# Patient Record
Sex: MALE | Race: BLACK OR AFRICAN AMERICAN | Employment: STUDENT | URBAN - METROPOLITAN AREA
[De-identification: names, ages, dates, MRNs, and addresses within clinical notes are randomized per-mention and may not be internally consistent; named-entity substitution may affect disease eponyms.]

---
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
## 2021-02-11 ENCOUNTER — VIRTUAL VISIT (OUTPATIENT)
Dept: FAMILY MEDICINE CLINIC | Age: 21
End: 2021-02-11
Payer: COMMERCIAL

## 2021-02-11 DIAGNOSIS — J45.40 MODERATE PERSISTENT ASTHMA WITHOUT COMPLICATION: ICD-10-CM

## 2021-02-11 PROCEDURE — 99214 OFFICE O/P EST MOD 30 MIN: CPT | Performed by: FAMILY MEDICINE

## 2021-02-11 RX ORDER — ALBUTEROL SULFATE 90 UG/1
AEROSOL, METERED RESPIRATORY (INHALATION)
Qty: 36 INHALER | Refills: 0 | Status: SHIPPED | OUTPATIENT
Start: 2021-02-11 | End: 2021-06-11

## 2021-02-11 RX ORDER — BUDESONIDE AND FORMOTEROL FUMARATE DIHYDRATE 160; 4.5 UG/1; UG/1
AEROSOL RESPIRATORY (INHALATION)
Qty: 30.6 INHALER | Refills: 3 | Status: SHIPPED | OUTPATIENT
Start: 2021-02-11 | End: 2021-06-03 | Stop reason: ALTCHOICE

## 2021-02-11 RX ORDER — ALBUTEROL SULFATE 0.83 MG/ML
SOLUTION RESPIRATORY (INHALATION)
Qty: 30 NEBULE | Refills: 3 | Status: SHIPPED | OUTPATIENT
Start: 2021-02-11

## 2021-02-17 NOTE — PATIENT INSTRUCTIONS

## 2021-02-17 NOTE — PROGRESS NOTES
Brandi Camarillo is a 24 y.o. male who was seen by synchronous (real-time) audio-video technology on 2/11/2021 for Asthma        Assessment & Plan:   Diagnoses and all orders for this visit:    1. Moderate persistent asthma without complication  -     albuterol (PROVENTIL VENTOLIN) 2.5 mg /3 mL (0.083 %) nebu; 1 unit dose  VIA NEBULIZER EVERY 4 HOURS AS NEEDED FOR COUGH/WHEEZE    Other orders  -     budesonide-formoteroL (Symbicort) 160-4.5 mcg/actuation HFAA; INHALE 2 PUFFS BY MOUTH TWICE DAILY WITH SPACER CHAMBER RINSE MOUTH AND THROAT AFTER USE  -     albuterol (Ventolin HFA) 90 mcg/actuation inhaler; TAKE 2 PUFFS BY INHALATION EVERY FOUR HOURS AS NEEDED FOR WHEEZING. -     inhalational spacing device; 1 Each by Does Not Apply route as needed for Wheezing. As above, not fully controlled   treatment plan as listed below  Orders Placed This Encounter    budesonide-formoteroL (Symbicort) 160-4.5 mcg/actuation HFAA    albuterol (Ventolin HFA) 90 mcg/actuation inhaler    albuterol (PROVENTIL VENTOLIN) 2.5 mg /3 mL (0.083 %) nebu    inhalational spacing device     Follow-up and Dispositions    · Return in about 6 months (around 8/11/2021) for well exam.       This has been fully explained to the patient, who indicates understanding. [facility] and pt has been advised of same. 712  Subjective:     Pt has asthma  He needs refills on his inhalers  He reports increase asthma sx when the weather changes  He has no acute sx today  Has no other complaints. Prior to Admission medications    Medication Sig Start Date End Date Taking? Authorizing Provider   budesonide-formoteroL (Symbicort) 160-4.5 mcg/actuation HFAA INHALE 2 PUFFS BY MOUTH TWICE DAILY WITH SPACER CHAMBER RINSE MOUTH AND THROAT AFTER USE 2/11/21  Yes Darci Tony MD   albuterol (Ventolin HFA) 90 mcg/actuation inhaler TAKE 2 PUFFS BY INHALATION EVERY FOUR HOURS AS NEEDED FOR WHEEZING.  2/11/21  Yes Dacri Tony MD   albuterol (PROVENTIL VENTOLIN) 2.5 mg /3 mL (0.083 %) nebu 1 unit dose  VIA NEBULIZER EVERY 4 HOURS AS NEEDED FOR COUGH/WHEEZE 2/11/21  Yes Jeremias Nassar MD   inhalational spacing device 1 Each by Does Not Apply route as needed for Wheezing. 2/11/21  Yes Jeremias Nassar MD   clobetasol (TEMOVATE) 0.05 % topical cream Apply  to affected area two (2) times daily as needed for Skin Irritation. 11/30/17  Yes Alicia Barone NP   montelukast (SINGULAIR) 10 mg tablet Take 1 Tab by mouth daily. 11/30/17  Yes Tariq Geiger NP   Nebulizer & Compressor machine by Does Not Apply route. Use with albuterol every four hours as needed for wheezing, coughing , or shortness of breath 11/1/11   Alicia Barone NP   MULTI-VITAMIN PO Take  by mouth. Provider, Historical     Patient Active Problem List    Diagnosis Date Noted    Moderate persistent asthma without complication 91/42/1149    Atopic dermatitis 04/28/2011    ADHD (attention deficit hyperactivity disorder) 10/07/2010     Allergies   Allergen Reactions    Peanut Swelling    Tree Nut Itching     Past Medical History:   Diagnosis Date    ADHD     Orthodoxy Psychotherapy    Allergic rhinitis     Contact dermatitis and other eczema, due to unspecified cause     eczema    GERD (gastroesophageal reflux disease)      No past surgical history on file. No family history on file. Social History     Tobacco Use    Smoking status: Never Smoker    Smokeless tobacco: Never Used   Substance Use Topics    Alcohol use: No       Review of Systems   Constitutional: Negative for chills and fever. Cardiovascular: Negative for chest pain and palpitations. All other systems reviewed and are negative. Objective:   No flowsheet data found.    General: alert, cooperative, no distress   Mental  status: normal mood, behavior, speech, dress, motor activity, and thought processes, able to follow commands   HENT: NCAT   Neck: no visualized mass Resp: no respiratory distress   Neuro: no gross deficits   Skin: no discoloration or lesions of concern on visible areas   Psychiatric: normal affect, consistent with stated mood, no evidence of hallucinations     Additional exam findings: none      We discussed the expected course, resolution and complications of the diagnosis(es) in detail. Medication risks, benefits, costs, interactions, and alternatives were discussed as indicated. I advised him to contact the office if his condition worsens, changes or fails to improve as anticipated. He expressed understanding with the diagnosis(es) and plan. Shankar Osman IV, who was evaluated through a patient-initiated, synchronous (real-time) audio-video encounter, and/or his healthcare decision maker, is aware that it is a billable service, with coverage as determined by his insurance carrier. He provided verbal consent to proceed: Yes, and patient identification was verified. It was conducted pursuant to the emergency declaration under the [address] [place] [address], [address] [place] [address] authority and the [employer] Act. A caregiver was present when appropriate. Ability to conduct physical exam was limited. I was at home. The patient was at home.       Tamra Ferreira MD

## 2021-06-01 ENCOUNTER — TELEPHONE (OUTPATIENT)
Dept: FAMILY MEDICINE CLINIC | Age: 21
End: 2021-06-01

## 2021-06-01 NOTE — TELEPHONE ENCOUNTER
Pt called states having issues with asthma with wheezing and sob, forwarded for triage, please adv [phone]

## 2021-06-01 NOTE — TELEPHONE ENCOUNTER
Patient call in c/o wheezing. Stated he had been using his inhalers, and not helping.  Scheduled sick call appt for this thursday

## 2021-06-03 ENCOUNTER — OFFICE VISIT (OUTPATIENT)
Dept: FAMILY MEDICINE CLINIC | Age: 21
End: 2021-06-03
Payer: COMMERCIAL

## 2021-06-03 VITALS
TEMPERATURE: 96.8 F | SYSTOLIC BLOOD PRESSURE: 106 MMHG | BODY MASS INDEX: 30.04 KG/M2 | WEIGHT: 254.4 LBS | OXYGEN SATURATION: 96 % | DIASTOLIC BLOOD PRESSURE: 69 MMHG | HEIGHT: 77 IN | HEART RATE: 61 BPM | RESPIRATION RATE: 17 BRPM

## 2021-06-03 DIAGNOSIS — J45.40 MODERATE PERSISTENT ASTHMA WITHOUT COMPLICATION: ICD-10-CM

## 2021-06-03 DIAGNOSIS — Z23 ENCOUNTER FOR IMMUNIZATION: ICD-10-CM

## 2021-06-03 DIAGNOSIS — Z00.00 ROUTINE MEDICAL EXAM: Primary | ICD-10-CM

## 2021-06-03 PROCEDURE — 90734 MENACWYD/MENACWYCRM VACC IM: CPT | Performed by: FAMILY MEDICINE

## 2021-06-03 PROCEDURE — 99395 PREV VISIT EST AGE 18-39: CPT | Performed by: FAMILY MEDICINE

## 2021-06-03 RX ORDER — FLUTICASONE PROPIONATE AND SALMETEROL 250; 50 UG/1; UG/1
1 POWDER RESPIRATORY (INHALATION) EVERY 12 HOURS
Qty: 1 INHALER | Refills: 3 | Status: SHIPPED | OUTPATIENT
Start: 2021-06-03 | End: 2022-08-02 | Stop reason: ALTCHOICE

## 2021-06-03 NOTE — PATIENT INSTRUCTIONS
Asthma Attack: Care Instructions  Overview     During an asthma attack, the airways swell and narrow. This makes it hard to breathe. Severe asthma attacks can be dangerous. But you can help prevent these attacks by keeping your asthma under control and treating symptoms before they get bad. Symptoms include being short of breath, having chest tightness, coughing, and wheezing. Noting and treating these symptoms can also help you avoid trips to the emergency room. If you notice any problems or new symptoms, get medical treatment right away. Follow-up care is a key part of your treatment and safety. Be sure to make and go to all appointments, and call your doctor if you are having problems. It's also a good idea to know your test results and keep a list of the medicines you take. How can you care for yourself at home? · Follow your asthma action plan to prevent and treat attacks. If you don't have an asthma action plan, work with your doctor to create one. · Take your asthma medicines exactly as prescribed. Talk to your doctor right away if you have any questions about how to take them. ? Use your quick-relief medicine when you have symptoms of an attack. Quick-relief medicine is usually an albuterol inhaler. Some people need to use quick-relief medicine before they exercise. ? Take your controller medicine every day, not just when you have symptoms. Controller medicine is usually an inhaled corticosteroid. The goal is to prevent problems before they occur. ? If your doctor prescribed corticosteroid pills to use during an attack, take them exactly as prescribed. It may take hours for the pills to work, but they may make the episode shorter and help you breathe better. ? Keep your quick-relief medicine with you at all times. · Talk to your doctor before using other medicines. Some medicines, such as aspirin, can cause asthma attacks in some people.   · If you have a peak flow meter, use it to check how well you are breathing. This can help you predict when an asthma attack is going to occur. Then you can take medicine to prevent the asthma attack or make it less severe. · Do not smoke or allow others to smoke around you. Avoid smoky places. Smoking makes asthma worse. If you need help quitting, talk to your doctor about stop-smoking programs and medicines. These can increase your chances of quitting for good. · Learn what triggers an asthma attack for you, and avoid the triggers when you can. Common triggers include colds, smoke, air pollution, dust, pollen, mold, pets, cockroaches, stress, and cold air. · Avoid colds and the flu. Talk to your doctor about getting a pneumococcal vaccine shot. If you have had one before, ask your doctor if you need a second dose. Get a flu vaccine every fall. If you must be around people with colds or the flu, wash your hands often. When should you call for help? Call 911 anytime you think you may need emergency care. For example, call if:    · You have severe trouble breathing. Call your doctor now or seek immediate medical care if:    · Your symptoms do not get better after you have followed your asthma action plan.     · You have new or worse trouble breathing.     · Your coughing and wheezing get worse.     · You cough up dark brown or bloody mucus (sputum).     · You have a new or higher fever. Watch closely for changes in your health, and be sure to contact your doctor if:    · You need to use quick-relief medicine on more than 2 days a week within a month (unless it is just for exercise).     · You cough more deeply or more often, especially if you notice more mucus or a change in the color of your mucus.     · You are not getting better as expected. Where can you learn more? Go to http://www.gray.com/  Enter B220 in the search box to learn more about \"Asthma Attack: Care Instructions. \"  Current as of: October 26, 2020               Content Version: 12.8  © 7739-4532 Healthwise, Incorporated. Care instructions adapted under license by [employer] (which disclaims liability or warranty for this information). If you have questions about a medical condition or this instruction, always ask your healthcare professional. [address] any warranty or liability for your use of this information.

## 2021-06-03 NOTE — PROGRESS NOTES
Casimiro Vizcarra presents today for   Chief Complaint   Patient presents with    Well Male     well exam    Asthma     f/u       Is someone accompanying this pt? no    Is the patient using any DME equipment during OV? no    Depression Screening:  3 most recent PHQ Screens 6/3/2021   Little interest or pleasure in doing things Not at all   Feeling down, depressed, irritable, or hopeless Not at all   Total Score PHQ 2 0   In the past year have you felt depressed or sad most days, even if you felt okay? -   Has there been a time in the past month when you have had serious thoughts about ending your life? -   Have you ever in your whole life, tried to kill yourself or made a suicide attempt? -       Learning Assessment:  Learning Assessment 8/7/2014   PRIMARY LEARNER Patient   PRIMARY LANGUAGE ENGLISH   LEARNER PREFERENCE PRIMARY DEMONSTRATION   ANSWERED BY patient   RELATIONSHIP SELF       Health Maintenance reviewed and discussed and ordered per Provider. Health Maintenance Due   Topic Date Due    Hepatitis C Screening  Never done    Pneumococcal 0-64 years (1 of 2 - PPSV23) Never done    HPV Age 9Y-34Y (3 - Male 2-dose series) Never done    DTaP/Tdap/Td series (6 - Tdap) 10/04/2012    COVID-19 Vaccine (1) Never done   . Coordination of Care:  1. Have you been to the ER, urgent care clinic since your last visit? Hospitalized since your last visit? no    2. Have you seen or consulted any other health care providers outside of the [address] [place] [address] since your last visit? Include any pap smears or colon screening. no            MA notes above. Subjective:   Casimiro Vizcarra is a 24 y.o. male presenting for his annual checkup. ROS:  Feeling well. No dyspnea or chest pain on exertion. No abdominal pain, change in bowel habits, black or bloody stools. No urinary tract or prostatic symptoms. No neurological complaints.     Specific concerns today: pt would like a letter for his [employer] sports team to state that due to his history of asthma and allergies to different allergens he can be exempt from the Covid vaccination. Patient has recurrent asthma symptoms he is currently not taking a maintenance inhaler as he does not feel like the Symbicort prescribed works effectively    Patient is interested in other vaccinations he may need prior to going to college. Patient Active Problem List    Diagnosis Date Noted    Moderate persistent asthma without complication 88/16/7108    Atopic dermatitis 04/28/2011    ADHD (attention deficit hyperactivity disorder) 10/07/2010     Allergies   Allergen Reactions    Peanut Swelling    Tree Nut Itching     Past Medical History:   Diagnosis Date    ADHD     Restorationism Psychotherapy    Allergic rhinitis     Contact dermatitis and other eczema, due to unspecified cause     eczema    GERD (gastroesophageal reflux disease)      No past surgical history on file. No family history on file. Social History     Tobacco Use    Smoking status: Never Smoker    Smokeless tobacco: Never Used   Substance Use Topics    Alcohol use: No             Objective:     Visit Vitals  /69 (BP 1 Location: Left arm, BP Patient Position: Sitting, BP Cuff Size: Adult long)   Pulse 61   Temp 96.8 °F (36 °C) (Temporal)   Resp 17   Ht 6' 5\" (1.956 m)   Wt 254 lb 6.4 oz (115.4 kg)   SpO2 96%   BMI 30.17 kg/m²     The patient appears well, alert, oriented x 3, in no distress. ENT normal.  Neck supple. No adenopathy or thyromegaly. DYLON. Lungs are clear, good air entry, no wheezes, rhonchi or rales. S1 and S2 normal, no murmurs, regular rate and rhythm. Abdomen is soft without tenderness, guarding, mass or organomegaly. Extremities show no edema, normal peripheral pulses. Neurological is normal without focal findings. Assessment/Plan:   healthy adult male      ICD-10-CM ICD-9-CM    1. Routine medical exam  Z00.00 V70.0    2.  Moderate persistent asthma without complication  L99.41 493.90    3. Encounter for immunization  Z23 V03.89 MENINGOCOCCAL (MENVEO) CONJUGATE VACCINE, SEROGROUPS A, C, Y AND W-135 (TETRAVALENT), IM   . As above  Patient is stable  Meningococcal vaccination today. treatment plan as listed below  Orders Placed This Encounter    Meningococcal (MENVEO) Conjugate Vaccine, Serogroups  A, C, Y and W-135 (TETRAVALENT), IM    fluticasone propion-salmeteroL (ADVAIR/WIXELA) 250-50 mcg/dose diskus inhaler     Start Advair as ordered. Follow-up and Dispositions    · Return in about 3 months (around 9/3/2021). This has been fully explained to the patient, who indicates understanding. An After Visit Summary was printed and given to the patient.

## 2021-06-03 NOTE — LETTER
6/3/2021 1:19 PM 
 
Mr. Neftaly Patton [address] [facility] [address] To Whom It May Concern: 
 
[facility] the [facility] Please be advised that Mr. Gaurang Coreas IV has multiple allergies that at this time precludes him from taking the COVID-19 vaccination. If there are questions or concerns please have the patient contact our office. Sincerely, Savana Recio MD

## 2021-06-11 RX ORDER — ALBUTEROL SULFATE 90 UG/1
AEROSOL, METERED RESPIRATORY (INHALATION)
Qty: 17 INHALER | Refills: 2 | Status: SHIPPED | OUTPATIENT
Start: 2021-06-11 | End: 2022-08-02 | Stop reason: SDUPTHER

## 2022-03-19 PROBLEM — J45.40 MODERATE PERSISTENT ASTHMA WITHOUT COMPLICATION: Status: ACTIVE | Noted: 2017-11-30

## 2022-08-02 ENCOUNTER — OFFICE VISIT (OUTPATIENT)
Dept: FAMILY MEDICINE CLINIC | Age: 22
End: 2022-08-02
Payer: COMMERCIAL

## 2022-08-02 VITALS
TEMPERATURE: 98.3 F | OXYGEN SATURATION: 98 % | BODY MASS INDEX: 26.87 KG/M2 | SYSTOLIC BLOOD PRESSURE: 117 MMHG | WEIGHT: 227.6 LBS | HEIGHT: 77 IN | DIASTOLIC BLOOD PRESSURE: 71 MMHG | RESPIRATION RATE: 18 BRPM | HEART RATE: 63 BPM

## 2022-08-02 DIAGNOSIS — Z00.00 ROUTINE MEDICAL EXAM: Primary | ICD-10-CM

## 2022-08-02 PROCEDURE — 99395 PREV VISIT EST AGE 18-39: CPT | Performed by: FAMILY MEDICINE

## 2022-08-02 RX ORDER — BUDESONIDE AND FORMOTEROL FUMARATE DIHYDRATE 160; 4.5 UG/1; UG/1
2 AEROSOL RESPIRATORY (INHALATION) DAILY
Qty: 10.2 G | Refills: 1 | Status: SHIPPED | OUTPATIENT
Start: 2022-08-02

## 2022-08-02 RX ORDER — CETIRIZINE HYDROCHLORIDE 10 MG/1
CAPSULE, LIQUID FILLED ORAL
COMMUNITY

## 2022-08-02 RX ORDER — BUDESONIDE AND FORMOTEROL FUMARATE DIHYDRATE 160; 4.5 UG/1; UG/1
2 AEROSOL RESPIRATORY (INHALATION)
COMMUNITY
End: 2022-08-02 | Stop reason: SDUPTHER

## 2022-08-02 RX ORDER — ALBUTEROL SULFATE 90 UG/1
2 AEROSOL, METERED RESPIRATORY (INHALATION)
Qty: 17 EACH | Refills: 2 | Status: SHIPPED | OUTPATIENT
Start: 2022-08-02

## 2022-08-02 NOTE — PROGRESS NOTES
Subjective:   Savanna Phillip is a 22 y.o. male presenting for his annual checkup. Patient has asthma  He has multiple seasonal allergens that precipitate asthma attacks  He is in need of verification that he needs single occupancy housing at school due to his multiple allergens. Accommodations form will be filled out. Patient has otherwise been well. 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 ROS:  Feeling well. No dyspnea or chest pain on exertion. No abdominal pain, change in bowel habits, black or bloody stools. No urinary tract or prostatic symptoms. No neurological complaints. Specific concerns today: None doing well. Patient Active Problem List   Diagnosis Code    ADHD (attention deficit hyperactivity disorder) F90.9    Atopic dermatitis L20.9    Moderate persistent asthma without complication K81.19     Current Outpatient Medications   Medication Sig Dispense Refill    Cetirizine (ZyrTEC) 10 mg cap Take  by mouth.      budesonide-formoteroL (Symbicort) 160-4.5 mcg/actuation HFAA Take 2 Puffs by inhalation in the morning. 10.2 g 1    albuterol (PROVENTIL HFA, VENTOLIN HFA, PROAIR HFA) 90 mcg/actuation inhaler Take 2 Puffs by inhalation every four (4) hours as needed for Wheezing. 17 Each 2    albuterol (PROVENTIL VENTOLIN) 2.5 mg /3 mL (0.083 %) nebu 1 unit dose  VIA NEBULIZER EVERY 4 HOURS AS NEEDED FOR COUGH/WHEEZE 30 Nebule 3    inhalational spacing device 1 Each by Does Not Apply route as needed for Wheezing. 1 Device 1    clobetasol (TEMOVATE) 0.05 % topical cream Apply  to affected area two (2) times daily as needed for Skin Irritation. 30 g 1    Nebulizer & Compressor machine by Does Not Apply route. Use with albuterol every four hours as needed for wheezing, coughing , or shortness of breath 1 Each 0    MULTI-VITAMIN PO Take  by mouth.        Allergies   Allergen Reactions    Peanut Swelling    Tree Nut Itching     Past Medical History:   Diagnosis Date ADHD     Latter day Psychotherapy    Allergic rhinitis     Contact dermatitis and other eczema, due to unspecified cause     eczema    GERD (gastroesophageal reflux disease)      History reviewed. No pertinent surgical history. History reviewed. No pertinent family history. Social History     Tobacco Use    Smoking status: Never    Smokeless tobacco: Never   Substance Use Topics    Alcohol use: No            Objective:     Visit Vitals  /71 (BP 1 Location: Left upper arm, BP Patient Position: Sitting, BP Cuff Size: Adult long)   Pulse 63   Temp 98.3 °F (36.8 °C) (Temporal)   Resp 18   Ht 6' 5\" (1.956 m)   Wt 227 lb 9.6 oz (103.2 kg)   SpO2 98%   BMI 26.99 kg/m²     The patient appears well, alert, oriented x 3, in no distress. ENT normal.  Neck supple. No adenopathy or thyromegaly. DYLON. Lungs are clear, good air entry, no wheezes, rhonchi or rales. S1 and S2 normal, no murmurs, regular rate and rhythm. Abdomen is soft without tenderness, guarding, mass or organomegaly. Extremities show no edema, normal peripheral pulses. Neurological is normal without focal findings. Assessment/Plan:   healthy adult male      ICD-10-CM ICD-9-CM    1. Routine medical exam  Z00.00 V70.0       . As above  Patient stable   treatment plan as listed below  Orders Placed This Encounter    DISCONTD: budesonide-formoteroL (Symbicort) 160-4.5 mcg/actuation HFAA    Cetirizine (ZyrTEC) 10 mg cap    budesonide-formoteroL (Symbicort) 160-4.5 mcg/actuation HFAA    albuterol (PROVENTIL HFA, VENTOLIN HFA, PROAIR HFA) 90 mcg/actuation inhaler     Refilled medications needed  Filled out accommodations form  Follow-up and Dispositions    Return in about 1 year (around 8/2/2023). This has been fully explained to the patient, who indicates understanding. An After Visit Summary was printed and given to the patient.

## 2022-08-02 NOTE — PROGRESS NOTES
1. \"Have you been to the ER, urgent care clinic since your last visit? Hospitalized since your last visit? \" No    2. \"Have you seen or consulted any other health care providers outside of the [address] [place] [address] since your last visit? \" No     3. For patients aged 39-70: Has the patient had a colonoscopy / FIT/ Cologuard?  NA - based on age

## 2022-08-05 ENCOUNTER — TELEPHONE (OUTPATIENT)
Dept: FAMILY MEDICINE CLINIC | Age: 22
End: 2022-08-05

## 2022-08-05 NOTE — TELEPHONE ENCOUNTER
Please contact the patient's plan to initiate a prior authorization for Symbicort or contact the pharmacy to change the medication. For [facility] in place:   Recommendation Provided To:    Intervention Detail: New Rx: 1, reason: Cost/Formulary Change  Gap Closed?:   Intervention Accepted By:   Time Spent (min): 5

## 2023-03-31 RX ORDER — ALBUTEROL SULFATE 90 UG/1
AEROSOL, METERED RESPIRATORY (INHALATION)
Qty: 17 EACH | Refills: 2 | Status: SHIPPED | OUTPATIENT
Start: 2023-03-31

## 2023-04-19 NOTE — TELEPHONE ENCOUNTER
Last Visit: 08- OV   Next Appointment: none  Previous Refill Encounter: 08- #10.2g with 1 refills      Requested Prescriptions     Pending Prescriptions Disp Refills    SYMBICORT 160-4.5 MCG/ACT AERO [Pharmacy Med Name: SYMBICORT 160-4.5 MCG INHALER] 10.2 each 1     Sig: TAKE 2 PUFFS BY INHALATION IN THE MORNING.

## 2023-04-21 RX ORDER — BUDESONIDE AND FORMOTEROL FUMARATE DIHYDRATE 160; 4.5 UG/1; UG/1
AEROSOL RESPIRATORY (INHALATION)
Qty: 10.2 EACH | Refills: 1 | Status: SHIPPED | OUTPATIENT
Start: 2023-04-21

## 2023-07-05 ENCOUNTER — OFFICE VISIT (OUTPATIENT)
Age: 23
End: 2023-07-05
Payer: MEDICAID

## 2023-07-05 VITALS
HEIGHT: 77 IN | DIASTOLIC BLOOD PRESSURE: 82 MMHG | SYSTOLIC BLOOD PRESSURE: 121 MMHG | HEART RATE: 64 BPM | WEIGHT: 232.8 LBS | BODY MASS INDEX: 27.49 KG/M2 | TEMPERATURE: 98.6 F | OXYGEN SATURATION: 98 % | RESPIRATION RATE: 16 BRPM

## 2023-07-05 DIAGNOSIS — J30.89 NON-SEASONAL ALLERGIC RHINITIS DUE TO OTHER ALLERGIC TRIGGER: ICD-10-CM

## 2023-07-05 DIAGNOSIS — J45.40 MODERATE PERSISTENT ASTHMA WITHOUT COMPLICATION: Primary | ICD-10-CM

## 2023-07-05 PROCEDURE — 99214 OFFICE O/P EST MOD 30 MIN: CPT | Performed by: FAMILY MEDICINE

## 2023-07-05 RX ORDER — BUDESONIDE AND FORMOTEROL FUMARATE DIHYDRATE 160; 4.5 UG/1; UG/1
AEROSOL RESPIRATORY (INHALATION)
Qty: 10.2 EACH | Refills: 2 | Status: SHIPPED | OUTPATIENT
Start: 2023-07-05

## 2023-07-05 RX ORDER — CLOBETASOL PROPIONATE 0.5 MG/G
CREAM TOPICAL 2 TIMES DAILY PRN
Qty: 30 G | Refills: 1 | Status: SHIPPED | OUTPATIENT
Start: 2023-07-05

## 2023-07-05 RX ORDER — ALBUTEROL SULFATE 90 UG/1
AEROSOL, METERED RESPIRATORY (INHALATION)
Qty: 17 EACH | Refills: 2 | Status: SHIPPED | OUTPATIENT
Start: 2023-07-05

## 2023-07-05 SDOH — ECONOMIC STABILITY: HOUSING INSECURITY
IN THE LAST 12 MONTHS, WAS THERE A TIME WHEN YOU DID NOT HAVE A STEADY PLACE TO SLEEP OR SLEPT IN A SHELTER (INCLUDING NOW)?: NO

## 2023-07-05 SDOH — ECONOMIC STABILITY: FOOD INSECURITY: WITHIN THE PAST 12 MONTHS, YOU WORRIED THAT YOUR FOOD WOULD RUN OUT BEFORE YOU GOT MONEY TO BUY MORE.: NEVER TRUE

## 2023-07-05 SDOH — ECONOMIC STABILITY: FOOD INSECURITY: WITHIN THE PAST 12 MONTHS, THE FOOD YOU BOUGHT JUST DIDN'T LAST AND YOU DIDN'T HAVE MONEY TO GET MORE.: NEVER TRUE

## 2023-07-05 SDOH — ECONOMIC STABILITY: INCOME INSECURITY: HOW HARD IS IT FOR YOU TO PAY FOR THE VERY BASICS LIKE FOOD, HOUSING, MEDICAL CARE, AND HEATING?: SOMEWHAT HARD

## 2023-07-05 ASSESSMENT — PATIENT HEALTH QUESTIONNAIRE - PHQ9
1. LITTLE INTEREST OR PLEASURE IN DOING THINGS: 0
SUM OF ALL RESPONSES TO PHQ QUESTIONS 1-9: 0
2. FEELING DOWN, DEPRESSED OR HOPELESS: 0
SUM OF ALL RESPONSES TO PHQ9 QUESTIONS 1 & 2: 0
SUM OF ALL RESPONSES TO PHQ QUESTIONS 1-9: 0

## 2023-07-05 NOTE — PROGRESS NOTES
HPI:  Jennifer Meyers is a 25 y.o. male who presents today with   Chief Complaint   Patient presents with    Allergies     Provider Letter: Accommodations for school    Asthma     Accommodations for school            Pt has been well   He thinks his allergies asthma had been worse when he was residing in a dorm room with mold. He had mold in his dorm room which exacerbated his asthma and allergies  He has moved to a new room; he needs a letter of accommodation for his school such that he is able to live in a dorm  room alone without risk of allergens which may precipitate his asthma and allergies. Pt has had difficulty sleeping. Has difficulty falling and staying asleep. Has tried some sleep hygiene measures, ( putting phone down, keeping room dark) but this was not helpful. Anny Suazo He states the over-the-counter medications such as melatonin made him more sleepy. Patient advised that the prescription hypnotics may make him sleepier. PMH,  Meds, Allergies, Family History, Social history reviewed      Current Outpatient Medications   Medication Sig Dispense Refill    Nebulizers (COMPRESSOR/NEBULIZER) MISC by Other route Machine      SYMBICORT 160-4.5 MCG/ACT AERO TAKE 2 PUFFS BY INHALATION IN THE MORNING. 10.2 each 1    albuterol sulfate HFA (PROVENTIL;VENTOLIN;PROAIR) 108 (90 Base) MCG/ACT inhaler INHALE 2 PUFFS BY MOUTH EVERY 4 HOURS AS NEEDED FOR WHEEZE 17 each 2    albuterol (PROVENTIL) (2.5 MG/3ML) 0.083% nebulizer solution 1 unit dose  VIA NEBULIZER EVERY 4 HOURS AS NEEDED FOR COUGH/WHEEZE      Cetirizine HCl (ZYRTEC ALLERGY) 10 MG CAPS Take by mouth      clobetasol (TEMOVATE) 0.05 % cream Apply topically 2 times daily as needed       No current facility-administered medications for this visit.         Allergies   Allergen Reactions    Macadamia Nut Oil Itching    Peanut Oil Swelling                  Review of Systems as per HPI        /82 (Site: Left Upper Arm, Position: Sitting, Cuff

## 2023-07-05 NOTE — PROGRESS NOTES
1. \"Have you been to the ER, urgent care clinic since your last visit? Hospitalized since your last visit? \" No    2. \"Have you seen or consulted any other health care providers outside of the [address] [place] [address] since your last visit? \" No     3. For patients aged 43-73: Has the patient had a colonoscopy / FIT/ Cologuard?  NA - based on age

## 2024-07-16 ENCOUNTER — TELEPHONE (OUTPATIENT)
Facility: CLINIC | Age: 24
End: 2024-07-16

## 2024-07-16 NOTE — TELEPHONE ENCOUNTER
Patient called in to see if dr morales could squeeze him in to see him for his asthma check up he goes back to school on August 15th told him I will let her nurse know. He said dr deras knows him and would probably do him a favor

## 2024-07-25 ENCOUNTER — TELEMEDICINE (OUTPATIENT)
Facility: CLINIC | Age: 24
End: 2024-07-25
Payer: MEDICAID

## 2024-07-25 DIAGNOSIS — J45.40 MODERATE PERSISTENT ASTHMA WITHOUT COMPLICATION: Primary | ICD-10-CM

## 2024-07-25 DIAGNOSIS — Z13.0 SCREENING FOR SICKLE-CELL DISEASE OR TRAIT: ICD-10-CM

## 2024-07-25 PROCEDURE — 99214 OFFICE O/P EST MOD 30 MIN: CPT | Performed by: FAMILY MEDICINE

## 2024-07-25 RX ORDER — ALBUTEROL SULFATE 90 UG/1
AEROSOL, METERED RESPIRATORY (INHALATION)
Qty: 17 EACH | Refills: 2 | Status: SHIPPED | OUTPATIENT
Start: 2024-07-25

## 2024-07-25 RX ORDER — CLOBETASOL PROPIONATE 0.5 MG/G
CREAM TOPICAL 2 TIMES DAILY PRN
Qty: 30 G | Refills: 1 | Status: SHIPPED | OUTPATIENT
Start: 2024-07-25

## 2024-07-25 RX ORDER — BUDESONIDE AND FORMOTEROL FUMARATE DIHYDRATE 160; 4.5 UG/1; UG/1
AEROSOL RESPIRATORY (INHALATION)
Qty: 10.2 EACH | Refills: 2 | Status: SHIPPED | OUTPATIENT
Start: 2024-07-25

## 2024-07-25 NOTE — PROGRESS NOTES
Unable to reach the patient for Virtual Appointment at 1740 today with the provider. I left a detailed message informing patient I will be calling back at a later time. Patient informed of online Questionnaires via [employer]  for Social Determinants, PREET-7 & PHQ-9 to be filled out before their Virtual appointment with the provider and acknowledged understanding, thank you.

## 2024-07-25 NOTE — PROGRESS NOTES
Sekou Jane IV, was evaluated through a synchronous (real-time) audio-video encounter. The patient (or guardian if applicable) is aware that this is a billable service, which includes applicable co-pays. This Virtual Visit was conducted with patient's (and/or legal guardian's) consent. Patient identification was verified, and a caregiver was present when appropriate.   The patient was located at Home: [address] [place] [address]  [address]  [facility] [id number]  Provider was located at Facility (Appt Dept): [address]  [place] [address]  Confirm you are appropriately licensed, registered, or certified to deliver care in the state where the patient is located as indicated above. If you are not or unsure, please re-schedule the visit: Yes, I confirm.     Sekou Jane IV (:  2000) is a Established patient, presenting virtually for evaluation of the following:    --Kym Aguiar MA

## 2024-07-26 ENCOUNTER — TELEPHONE (OUTPATIENT)
Facility: CLINIC | Age: 24
End: 2024-07-26

## 2024-07-26 NOTE — TELEPHONE ENCOUNTER
Left patient voicemail to contact office to schedule follow appointment and lab       Return in about 6 months (around 1/25/2025).       Also pt needs a lab appointment for sickle cell screen ( Lab already placed) Thanks!!

## 2024-07-29 ENCOUNTER — TELEPHONE (OUTPATIENT)
Facility: CLINIC | Age: 24
End: 2024-07-29

## 2024-07-29 NOTE — TELEPHONE ENCOUNTER
Left patient voicemail to contact office to schedule follow up appointment   Additionally patient is in need of a lab  appointment for now.     Return in about 6 months (around 1/25/2025).

## 2024-11-05 NOTE — TELEPHONE ENCOUNTER
Last Visit: 07/25/2024- virtual, 07/05/2023- clinic   Next Appointment: N/A    Requested Prescriptions     Pending Prescriptions Disp Refills    clobetasol (TEMOVATE) 0.05 % cream [Pharmacy Med Name: CLOBETASOL 0.05% CREAM] 30 g 1     Sig: APPLY TOPICALLY 2 TIMES DAILY AS NEEDED (ECZEMA)

## 2024-11-08 RX ORDER — CLOBETASOL PROPIONATE 0.5 MG/G
CREAM TOPICAL 2 TIMES DAILY PRN
Qty: 30 G | Refills: 1 | Status: SHIPPED | OUTPATIENT
Start: 2024-11-08

## 2024-11-18 NOTE — TELEPHONE ENCOUNTER
Last Visit: 07/25/2024- virtual, 07/05/2023- clinic   Next Appointment: N/A    Requested Prescriptions     Pending Prescriptions Disp Refills    albuterol sulfate HFA (PROVENTIL;VENTOLIN;PROAIR) 108 (90 Base) MCG/ACT inhaler 17 each 2     Sig: INHALE 2 PUFFS BY MOUTH EVERY 4 HOURS AS NEEDED FOR WHEEZE    budesonide-formoterol (SYMBICORT) 160-4.5 MCG/ACT AERO 10.2 each 2     Sig: TAKE 2 PUFFS BY INHALATION IN THE MORNING.

## 2024-11-23 RX ORDER — BUDESONIDE AND FORMOTEROL FUMARATE DIHYDRATE 160; 4.5 UG/1; UG/1
AEROSOL RESPIRATORY (INHALATION)
Qty: 10.2 EACH | Refills: 2 | Status: SHIPPED | OUTPATIENT
Start: 2024-11-23

## 2024-11-23 RX ORDER — ALBUTEROL SULFATE 90 UG/1
INHALANT RESPIRATORY (INHALATION)
Qty: 17 EACH | Refills: 2 | Status: SHIPPED | OUTPATIENT
Start: 2024-11-23